# Patient Record
Sex: MALE | Race: WHITE | Employment: FULL TIME | URBAN - METROPOLITAN AREA
[De-identification: names, ages, dates, MRNs, and addresses within clinical notes are randomized per-mention and may not be internally consistent; named-entity substitution may affect disease eponyms.]

---

## 2018-12-04 ENCOUNTER — APPOINTMENT (OUTPATIENT)
Dept: GENERAL RADIOLOGY | Age: 48
End: 2018-12-04
Attending: PHYSICIAN ASSISTANT
Payer: COMMERCIAL

## 2018-12-04 ENCOUNTER — HOSPITAL ENCOUNTER (EMERGENCY)
Age: 48
Discharge: HOME | End: 2018-12-04
Attending: EMERGENCY MEDICINE
Payer: COMMERCIAL

## 2018-12-04 VITALS
OXYGEN SATURATION: 100 % | HEART RATE: 81 BPM | HEIGHT: 68 IN | DIASTOLIC BLOOD PRESSURE: 99 MMHG | WEIGHT: 247 LBS | SYSTOLIC BLOOD PRESSURE: 143 MMHG | RESPIRATION RATE: 16 BRPM | BODY MASS INDEX: 37.44 KG/M2 | TEMPERATURE: 98 F

## 2018-12-04 DIAGNOSIS — R03.0 ELEVATED BLOOD PRESSURE READING: ICD-10-CM

## 2018-12-04 DIAGNOSIS — S62.101A CLOSED FRACTURE OF RIGHT WRIST, INITIAL ENCOUNTER: Primary | ICD-10-CM

## 2018-12-04 PROCEDURE — A4565 SLINGS: HCPCS

## 2018-12-04 PROCEDURE — 75810000053 HC SPLINT APPLICATION

## 2018-12-04 PROCEDURE — 73110 X-RAY EXAM OF WRIST: CPT

## 2018-12-04 PROCEDURE — 99283 EMERGENCY DEPT VISIT LOW MDM: CPT

## 2018-12-04 NOTE — ED TRIAGE NOTES
Ashley Benítez on right wrist Wednesday. Seen at urgent care. Fractured right wrist. . Swelling and bruising. Was sent to ortho and was unable to get casted. Had splint on bur fingers and arm keeps being numb. All fingers numb.

## 2018-12-04 NOTE — ED PROVIDER NOTES
EMERGENCY DEPARTMENT HISTORY AND PHYSICAL EXAM 
 
6:37 PM 
 
 
Date: 12/4/2018 Patient Name: Stevenson Jefferson History of Presenting Illness Chief Complaint Patient presents with  Wrist Pain History Provided By: Patient Chief Complaint: Wrist pain Duration:  6 days Timing:  Progressive and Worsening Location: Right wrist  
Quality: Numbness Severity: Patient currently denies pain Modifying Factors: Reports the splint exacerbated the patient's presentation. Associated Symptoms: Patient also reports numbness to the wrist. Denies other associated symptoms. Additional History (Context): Stevenson Jefferson is a 57 y.o. male with No significant past medical history who presents with right wrist pain onset 6 days ago. Patient reports he was outside when he slipped and fell onto his wrist. Patient states he was seen at an urgent care, where he was told he has a known fracture and was placed in a splint. Patient reports taking off the splint 1 day ago because it exacerbated the patient's presentation. Reports experiencing numbness to the right wrist. States when he went to see the Orthopedist at [facility] for follow-up, there was no one present, so he came to the ED for an evaluation. Patient denies other associated symptoms. No other concerns were expressed at this time. PCP: None Past History Past Medical History: 
History reviewed. No pertinent past medical history. Past Surgical History: 
History reviewed. No pertinent surgical history. Family History: 
History reviewed. No pertinent family history. Social History: 
Social History Tobacco Use  Smoking status: Current Every Day Smoker  Smokeless tobacco: Never Used Substance Use Topics  Alcohol use: Not on file  Drug use: Not on file Allergies: Allergies Allergen Reactions  Codeine Rash Review of Systems Review of Systems Constitutional: Negative for chills and fever. Respiratory: Negative for shortness of breath. Cardiovascular: Negative for chest pain. Gastrointestinal: Negative for abdominal pain, nausea and vomiting. Musculoskeletal: Positive for arthralgias (right wrist ). Skin: Negative for rash. Neurological: Positive for numbness (right wrist ). Negative for weakness. All other systems reviewed and are negative. Physical Exam  
 
Visit Vitals BP (!) 143/99 (BP 1 Location: Right arm, BP Patient Position: At rest) Pulse 81 Temp 98 °F (36.7 °C) Resp 16 Ht 5' 8\" (1.727 m) Wt 112 kg (247 lb) SpO2 100% BMI 37.56 kg/m² Physical Exam  
Constitutional: He appears well-developed and well-nourished. No distress. HENT:  
Head: Normocephalic and atraumatic. Neck: Normal range of motion. Neck supple. Cardiovascular: Normal rate, regular rhythm, normal heart sounds and intact distal pulses. Exam reveals no gallop and no friction rub. No murmur heard. Pulses: 
     Radial pulses are 2+ on the right side. Pulmonary/Chest: Effort normal and breath sounds normal. No respiratory distress. He has no wheezes. He has no rales. Musculoskeletal: Normal range of motion. Radial aspect of the right wrist: moderate tenderness to palpation. Mild edema and ecchymosis of forearm. Sensation intact throughout the digits Full ROM of extremity Neurological: He is alert. Skin: Skin is warm. Ecchymosis noted. No rash noted. He is not diaphoretic. Nursing note and vitals reviewed. Diagnostic Study Results Labs - No results found for this or any previous visit (from the past 12 hour(s)). Radiologic Studies -  
XR WRIST RT AP/LAT/OBL MIN 3V    (Results Pending) RADIOLOGY FINDINGS 
R wrist X-ray shows comminuted radius fracture Pending review by Radiologist 
Recorded by Fernando Emerson PA-C Medical Decision Making I am the first provider for this patient. I reviewed the vital signs, available nursing notes, past medical history, past surgical history, family history and social history. Vital Signs-Reviewed the patient's vital signs. Pulse Oximetry Analysis -  100%  on room air (Interpretation) Records Reviewed: Nursing Notes and Triage notes  (Time of Review: 6:37 PM) ED Course: Progress Notes, Reevaluation, and Consults: 
Consult:  Discussed care with Dr. Hakeem Lee, Ortho Standard discussion; including history of patients chief complaint, available diagnostic results, and treatment course. Recommends a splint and outpatient follow-up in the office. Patient will need to see one of his colleagues. 8:15 PM 
 
8:28 PM 
Splint re-assessment: Extremity is neurovascularly intact. Splint and sling is in place. I discussed the importance of Orthopedic follow-up this week for re-assessment and cast placement. Discussed that he cannot remove the splint until re-evaluated by orthopedic. Pt in agreement with plan. Provider Notes (Medical Decision Making): 70-year-old male presents due to a R wrist fracture. Extremity is neurovascularly intact. Sensation is intact throughout. Splint and sling were provided. Will follow-up with orthopedic as outpatient. Diagnosis Clinical Impression: 1. Closed fracture of right wrist, initial encounter 2. Elevated blood pressure reading Disposition: Discharged Follow-up Information Follow up With Specialties Details Why Contact [facility] EMERGENCY DEPT Emergency Medicine  If symptoms worsen [address] 
[phone] [address] Specialists  Schedule an appointment as soon as possible for a visit  [address]  [address] [facility] [address] [phone] Medication List  
  
You have not been prescribed any medications. _______________________________ Scribe Attestation Ernestina Garduno acting as a scribe for and in the presence of Stu Willett December 04, 2018 at 6:37 PM 
    
Provider Attestation:     
I personally performed the services described in the documentation, reviewed the documentation, as recorded by the scribe in my presence, and it accurately and completely records my words and actions. December 04, 2018 at 6:37 PM - Chad Gastelum PA-C 
 
 
 
_______________________________

## 2018-12-05 NOTE — DISCHARGE INSTRUCTIONS
Remove your arm from the sling every hour to prevent frozen shoulder. Follow-up with the orthopedic physician this week for reassessment. Call tomorrow to make follow-up appointment. Bring the results from this visit with your for their review. Do not remove the splint until you are assessed for follow-up and cast placement. Return to the ED for any new, worsening, or persistent symptoms. [place] for other orthopedic physicians in your area    Elevated Blood Pressure: Care Instructions  Your Care Instructions    Blood pressure is a measure of how hard the blood pushes against the walls of your arteries. It's normal for blood pressure to go up and down throughout the day. But if it stays up over time, you have high blood pressure. Two numbers tell you your blood pressure. The first number is the systolic pressure. It shows how hard the blood pushes when your heart is pumping. The second number is the diastolic pressure. It shows how hard the blood pushes between heartbeats, when your heart is relaxed and filling with blood. An ideal blood pressure in adults is less than 120/80 (say \"120 over 80\"). High blood pressure is 140/90 or higher. You have high blood pressure if your top number is 140 or higher or your bottom number is 90 or higher, or both. The main test for high blood pressure is simple, fast, and painless. To diagnose high blood pressure, your doctor will test your blood pressure at different times. After testing your blood pressure, your doctor may ask you to test it again when you are home. If you are diagnosed with high blood pressure, you can work with your doctor to make a long-term plan to manage it. Follow-up care is a key part of your treatment and safety. Be sure to make and go to all appointments, and call your doctor if you are having problems. It's also a good idea to know your test results and keep a list of the medicines you take. How can you care for yourself at home?   · Do not smoke. Smoking increases your risk for heart attack and stroke. If you need help quitting, talk to your doctor about stop-smoking programs and medicines. These can increase your chances of quitting for good. · Stay at a healthy weight. · Try to limit how much sodium you eat to less than 2,300 milligrams (mg) a day. Your doctor may ask you to try to eat less than 1,500 mg a day. · Be physically active. Get at least 30 minutes of exercise on most days of the week. Walking is a good choice. You also may want to do other activities, such as running, swimming, cycling, or playing tennis or team sports. · Avoid or limit alcohol. Talk to your doctor about whether you can drink any alcohol. · Eat plenty of fruits, vegetables, and low-fat dairy products. Eat less saturated and total fats. · Learn how to check your blood pressure at home. When should you call for help? Call your doctor now or seek immediate medical care if:  ? · Your blood pressure is much higher than normal (such as 180/110 or higher). ? · You think high blood pressure is causing symptoms such as:  ¨ Severe headache. ¨ Blurry vision. ? Watch closely for changes in your health, and be sure to contact your doctor if:  ? · You do not get better as expected. Where can you learn more? Go to http://joanne-freida.info/. Enter T108 in the search box to learn more about \"Elevated Blood Pressure: Care Instructions. \"  Current as of: September 21, 2016  Content Version: 11.4  © 3146-7945 [employer]. Care instructions adapted under license by [employer] (which disclaims liability or warranty for this information). If you have questions about a medical condition or this instruction, always ask your healthcare professional. [place] [address] any warranty or liability for your use of this information.          Wearing a Plaster Cast: Care Instructions  Your Care Instructions    A cast protects a broken bone or other injury while it heals. Your cast is made of plaster. After a cast is put on, you can't remove it yourself. Your doctor or a technician will take it off. Follow-up care is a key part of your treatment and safety. Be sure to make and go to all appointments, and call your doctor if you are having problems. It's also a good idea to know your test results and keep a list of the medicines you take. How can you care for yourself at home? General care  · Follow your doctor's instructions for when you can start using the limb that has the cast. Plaster casts may take several days before they are hard enough to protect the injured limb. · When it's okay to put weight on your leg or foot cast, don't stand or walk on it unless it's designed for walking. · Prop up the injured arm or leg on a pillow anytime you sit or lie down during the first 3 days. Try to keep it above the level of your heart. This will help reduce swelling. · Put ice or a cold pack on your cast for 10 to 20 minutes at a time. Try to do this every 1 to 2 hours for the next 3 days (when you are awake). Put a thin cloth between the ice and your cast. Keep the cast dry. · Be safe with medicines. Read and follow all instructions on the label. ? If the doctor gave you a prescription medicine for pain, take it as prescribed. ? If you are not taking a prescription pain medicine, ask your doctor if you can take an over-the-counter medicine. · Do exercises as instructed by your doctor or physical therapist. These exercises will help keep your muscles strong and your joints flexible while you heal.  · Wiggle your fingers or toes on the injured arm or leg often. This helps reduce swelling and stiffness. Water and your cast  · Keep your cast completely dry. The plaster will start to break down if it gets wet.   · Use a bag or tape a sheet of plastic to cover your cast when you take a shower or bath or when you have any other contact with water. (Don't take a bath unless you can keep the cast out of the water.) Moisture can collect under the cast and cause skin irritation and itching. It can make infection more likely if you had surgery or have a wound under the cast.  Cast and skin care  · Try blowing cool air from a hair dryer or fan into the cast to help relieve itching. Never stick items under your cast to scratch the skin. · Don't use oils or lotions near your cast. If the skin gets red or irritated around the edge of the cast, you may pad the edges with a soft material or use tape to cover them. When should you call for help? Call your doctor now or seek immediate medical care if:    · You have increased or severe pain.     · You feel a warm or painful spot under the cast.     · You have problems with your cast. For example:  ? The skin under the cast burns or stings. ? The cast feels too tight. ? There is a lot of swelling near the cast. (Some swelling is normal.)  ? You have a new fever. ? There is drainage or a bad smell coming from the cast.     · Your foot or hand is cool or pale or changes color.     · You have trouble moving your fingers or toes.     · You have symptoms of a blood clot in your arm or leg (called a deep vein thrombosis). These may include:  ? Pain in the arm, calf, back of the knee, thigh, or groin. ? Redness and swelling in the arm, leg, or groin.    Watch closely for changes in your health, and be sure to contact your doctor if:    · The cast is breaking apart.     · You are not getting better as expected. Where can you learn more? Go to http://joanne-freida.info/. Enter P140 in the search box to learn more about \"Wearing a Plaster Cast: Care Instructions. \"  Current as of: December 9, 2017  Content Version: 11.8  © 9669-6578 [employer]. Care instructions adapted under license by [employer] (which disclaims liability or warranty for this information).  If you have questions about a medical condition or this instruction, always ask your healthcare professional. [address] any warranty or liability for your use of this information. Broken Wrist: Care Instructions  Your Care Instructions    Your wrist can break, or fracture, during sports, a fall, or other accidents. The break may happen when your wrist is hit or is used to protect you in a fall. Fractures can range from a small, hairline crack, to a bone or bones broken into two or more pieces. Your treatment depends on how bad the break is. Your doctor may have put your wrist in a cast or splint. This will help keep your wrist stable until your follow-up appointment. It may take weeks or months for your wrist to heal. You can help it heal with care at home. You heal best when you take good care of yourself. Eat a variety of healthy foods, and don't smoke. Follow-up care is a key part of your treatment and safety. Be sure to make and go to all appointments, and call your doctor if you are having problems. It's also a good idea to know your test results and keep a list of the medicines you take. How can you care for yourself at home? · Put ice or a cold pack on your wrist for 10 to 20 minutes at a time. Try to do this every 1 to 2 hours for the next 3 days (when you are awake). Put a thin cloth between the ice and your cast or splint. Keep your cast or splint dry. · Follow the splint or cast care instructions your doctor gives you. If you have a splint, do not take it off unless your doctor tells you to. Be careful not to put the splint on too tight. · Be safe with medicines. Take pain medicines exactly as directed. ? If the doctor gave you a prescription medicine for pain, take it as prescribed. ? If you are not taking a prescription pain medicine, ask your doctor if you can take an over-the-counter medicine.   · Prop up your wrist on pillows when you sit or lie down in the first few days after the injury. Keep your wrist higher than the level of your heart. This will help reduce swelling. · Move your fingers often to reduce swelling and stiffness, but do not use that hand to grab or carry anything. · Follow instructions for exercises to keep your arm strong. When should you call for help? Call your doctor now or seek immediate medical care if:    · You have new or worse pain.     · Your hand or fingers are cool or pale or change color.     · Your cast or splint feels too tight.     · You have tingling, weakness, or numbness in your hand or fingers.    Watch closely for changes in your health, and be sure to contact your doctor if:    · You do not get better as expected.     · You have problems with your cast or splint. Where can you learn more? Go to http://joanne-freida.info/. Enter [id number] in the search box to learn more about \"Broken Wrist: Care Instructions. \"  Current as of: November 29, 2017  Content Version: 11.8  © 5002-4014 [employer]. Care instructions adapted under license by [employer] (which disclaims liability or warranty for this information). If you have questions about a medical condition or this instruction, always ask your healthcare professional. [address] any warranty or liability for your use of this information.

## 2018-12-12 NOTE — ED NOTES
Caring callback to ensure patient followed up with orthopedics for wrist fracture. No answer, left message to call back if any concerns or if was unable to follow-up.